# Patient Record
Sex: MALE | Race: WHITE | NOT HISPANIC OR LATINO | URBAN - METROPOLITAN AREA
[De-identification: names, ages, dates, MRNs, and addresses within clinical notes are randomized per-mention and may not be internally consistent; named-entity substitution may affect disease eponyms.]

---

## 2017-11-08 ENCOUNTER — OFFICE VISIT (OUTPATIENT)
Dept: URGENT CARE | Facility: CLINIC | Age: 22
End: 2017-11-08

## 2017-11-08 PROCEDURE — G0382 LEV 3 HOSP TYPE B ED VISIT: HCPCS

## 2017-11-16 NOTE — PROGRESS NOTES
Assessment    1  Mild intermittent asthma, unspecified whether complicated (243 85) (W09 42)    Plan  Mild intermittent asthma, unspecified whether complicated    · Flovent  MCG/ACT Inhalation Aerosol; INHALE 1 PUFF TWICE DAILY   · Ventolin  (90 Base) MCG/ACT Inhalation Aerosol Solution; INHALE 2PUFFS EVERY 4 HOURS AS NEEDED    Discussion/Summary  Discussion Summary:   Recommend taking daily antihistamine (Claritin, Zyrtec, or Allegra)  given for asthma inhalers to use until he able to establish care with PCP  inhalers as needed  up with PCP  Medication Side Effects Reviewed: Possible side effects of new medications were reviewed with the patient/guardian today  Understands and agrees with treatment plan: The treatment plan was reviewed with the patient/guardian  The patient/guardian understands and agrees with the treatment plan      Chief Complaint  Chief Complaint Free Text Note Form: Pt reports tonight he was having difficulty taking a deep breath  He used his ventolin inhaler but reports his ventolin and flovent are  and has not seen his PCP  History of Present Illness  HPI: Well appearing 24 yo male in no acute distress presents for request for refill on asthma medications  Pt reports intermittent wheezing, worse over the past 2 weeks  Pt reports asthma is worse with exposure to irritants  Pt reports he typically uses albuterol 2-3 times a month, but has been using it 3-4 times a week over the past 2 weeks  Hospital Based Practices Required Assessment:  Pain Assessment  the patient states they do not have pain  Abuse And Domestic Violence Screen   Yes, the patient is safe at home  -- The patient states no one is hurting them  Depression And Suicide Screen  No, the patient has not had thoughts of hurting themself  No, the patient has not felt depressed in the past 7 days  Prefered Language is  [place]  Primary Language is  English        Review of Systems  Focused-Male: Constitutional: no fever or chills, feels well, no tiredness, no recent weight loss or weight gain  Cardiovascular: no complaints of slow or fast heart rate, no chest pain, no palpitations, no leg claudication or lower extremity edema  Respiratory: as noted in HPI  Past Medical History  1  History of asthma (V12 69) (Z87 09)    Social History     · Never a smoker    Current Meds   1  Flovent 110 MCG/ACT AERO; Therapy: (Recorded:08Nov2017) to Recorded   2  Ventolin 90 MCG/ACT AERS; Therapy: (Recorded:08Nov2017) to Recorded    Allergies    1  No Known Drug Allergies    Vitals  Signs   Recorded: [id number] 06:41PM   Temperature: 98 6 F  Heart Rate: 79  Respiration: 16  Systolic: 995  Diastolic: 78  Height: 5 ft 10 in  Weight: 195 lb   BMI Calculated: 27 98  BSA Calculated: 2 06  O2 Saturation: 97    Physical Exam   Constitutional  General appearance: No acute distress, well appearing and well nourished  Eyes  Conjunctiva and lids: No swelling, erythema, or discharge  Pupils and irises: Equal, round and reactive to light  Ears, Nose, Mouth, and Throat  External inspection of ears and nose: Normal    Otoscopic examination: Tympanic membrance translucent with normal light reflex  Canals patent without erythema  Nasal mucosa, septum, and turbinates: Normal without edema or erythema  Oropharynx: Normal with no erythema, edema, exudate or lesions  Pulmonary  Respiratory effort: No increased work of breathing or signs of respiratory distress  Auscultation of lungs: Clear to auscultation  Cardiovascular  Auscultation of heart: Normal rate and rhythm, normal S1 and S2, without murmurs         Signatures   Electronically signed by : Abbey Fenton ; Nov 11 2017  8:29AM EST                       (Author)    Electronically signed by : Lacie Grigsby DO; Nov 15 2017  3:49PM EST                       (Co-author)

## 2018-02-09 ENCOUNTER — OFFICE VISIT (OUTPATIENT)
Dept: URGENT CARE | Facility: CLINIC | Age: 23
End: 2018-02-09

## 2018-02-09 VITALS
RESPIRATION RATE: 16 BRPM | HEART RATE: 88 BPM | TEMPERATURE: 100.2 F | BODY MASS INDEX: 27.77 KG/M2 | OXYGEN SATURATION: 95 % | DIASTOLIC BLOOD PRESSURE: 78 MMHG | HEIGHT: 70 IN | SYSTOLIC BLOOD PRESSURE: 142 MMHG | WEIGHT: 194 LBS

## 2018-02-09 DIAGNOSIS — R68.89 FLU-LIKE SYMPTOMS: ICD-10-CM

## 2018-02-09 DIAGNOSIS — J45.21 MILD INTERMITTENT ASTHMA WITH ACUTE EXACERBATION: Primary | ICD-10-CM

## 2018-02-09 PROBLEM — J45.20 MILD INTERMITTENT ASTHMA: Status: ACTIVE | Noted: 2018-02-09

## 2018-02-09 PROCEDURE — 99203 OFFICE O/P NEW LOW 30 MIN: CPT | Performed by: NURSE PRACTITIONER

## 2018-02-09 PROCEDURE — 87798 DETECT AGENT NOS DNA AMP: CPT | Performed by: NURSE PRACTITIONER

## 2018-02-09 RX ORDER — ALBUTEROL SULFATE 2.5 MG/3ML
2.5 SOLUTION RESPIRATORY (INHALATION) EVERY 6 HOURS PRN
Qty: 75 ML | Refills: 0 | Status: SHIPPED | OUTPATIENT
Start: 2018-02-09 | End: 2018-02-09 | Stop reason: SDUPTHER

## 2018-02-09 RX ORDER — ALBUTEROL SULFATE 90 UG/1
2 AEROSOL, METERED RESPIRATORY (INHALATION) EVERY 6 HOURS PRN
COMMUNITY

## 2018-02-09 RX ORDER — FLUTICASONE PROPIONATE 110 UG/1
AEROSOL, METERED RESPIRATORY (INHALATION)
COMMUNITY

## 2018-02-09 RX ORDER — ALBUTEROL SULFATE 90 UG/1
AEROSOL, METERED RESPIRATORY (INHALATION)
COMMUNITY

## 2018-02-09 RX ORDER — PREDNISONE 20 MG/1
40 TABLET ORAL DAILY
Qty: 10 TABLET | Refills: 0 | Status: SHIPPED | OUTPATIENT
Start: 2018-02-09 | End: 2018-02-09 | Stop reason: SDUPTHER

## 2018-02-09 RX ORDER — ALBUTEROL SULFATE 2.5 MG/3ML
2.5 SOLUTION RESPIRATORY (INHALATION) EVERY 6 HOURS PRN
Qty: 75 ML | Refills: 0 | Status: SHIPPED | OUTPATIENT
Start: 2018-02-09

## 2018-02-09 RX ORDER — OSELTAMIVIR PHOSPHATE 75 MG/1
75 CAPSULE ORAL EVERY 12 HOURS SCHEDULED
Qty: 10 CAPSULE | Refills: 0 | Status: SHIPPED | OUTPATIENT
Start: 2018-02-09 | End: 2018-02-14

## 2018-02-09 RX ORDER — PREDNISONE 20 MG/1
40 TABLET ORAL DAILY
Qty: 10 TABLET | Refills: 0 | Status: SHIPPED | OUTPATIENT
Start: 2018-02-09 | End: 2018-02-14

## 2018-02-09 RX ORDER — FLUTICASONE PROPIONATE 110 UG/1
1 AEROSOL, METERED RESPIRATORY (INHALATION) 2 TIMES DAILY
COMMUNITY

## 2018-02-10 LAB
FLUAV AG SPEC QL: ABNORMAL
FLUBV AG SPEC QL: DETECTED
RSV B RNA SPEC QL NAA+PROBE: ABNORMAL

## 2018-02-10 NOTE — ASSESSMENT & PLAN NOTE
Will send for influenza/RSV  Rx given for Tamiflu to hold and use if positive  Acetaminophen or ibuprofen as needed for symptom relief

## 2018-02-10 NOTE — PROGRESS NOTES
Assessment/Plan:    Mild intermittent asthma  Rx for prednisone  Refill given for albuterol neb  Increase hydration  Avoid irritants when possible  Follow up with PCP as needed  Flu-like symptoms  Will send for influenza/RSV  Rx given for Tamiflu to hold and use if positive  Acetaminophen or ibuprofen as needed for symptom relief  Diagnoses and all orders for this visit:    Mild intermittent asthma with acute exacerbation  -     Discontinue: predniSONE 20 mg tablet; Take 2 tablets (40 mg total) by mouth daily for 5 days  -     Discontinue: albuterol (2 5 mg/3 mL) 0 083 % nebulizer solution; Take 3 mL (2 5 mg total) by nebulization every 6 (six) hours as needed for wheezing  -     albuterol (2 5 mg/3 mL) 0 083 % nebulizer solution; Take 3 mL (2 5 mg total) by nebulization every 6 (six) hours as needed for wheezing  -     predniSONE 20 mg tablet; Take 2 tablets (40 mg total) by mouth daily for 5 days    Flu-like symptoms  -     Influenza A/B and RSV by PCR  -     oseltamivir (TAMIFLU) 75 mg capsule; Take 1 capsule (75 mg total) by mouth every 12 (twelve) hours for 5 days    Other orders  -     fluticasone (FLOVENT HFA) 110 MCG/ACT inhaler; Inhale 1 puff 2 (two) times a day  -     albuterol (PROVENTIL HFA,VENTOLIN HFA) 90 mcg/act inhaler; Inhale 2 puffs every 6 (six) hours as needed for wheezing  -     fluticasone (FLOVENT HFA) 110 MCG/ACT inhaler; Inhale  -     albuterol (VENTOLIN HFA) 90 mcg/act inhaler; Inhale          Subjective:      Patient ID: Charline Cisneros is a 25 y o  male  Cough   This is a new problem  The current episode started yesterday  The problem has been unchanged  The problem occurs hourly  The cough is non-productive  Associated symptoms include a fever, nasal congestion, postnasal drip, rhinorrhea, shortness of breath and wheezing   Pertinent negatives include no chest pain, chills, ear congestion, ear pain, headaches, heartburn, hemoptysis, myalgias, sore throat, sweats or weight loss  The symptoms are aggravated by dust and cold air  Risk factors for lung disease include occupational exposure  He has tried a beta-agonist inhaler, body position changes, rest and steroid inhaler for the symptoms  The treatment provided mild relief  His past medical history is significant for environmental allergies  The following portions of the patient's history were reviewed and updated as appropriate: allergies, current medications, past family history, past medical history, past social history, past surgical history and problem list     Review of Systems   Constitutional: Positive for fatigue and fever  Negative for activity change, appetite change, chills and weight loss  HENT: Positive for congestion, postnasal drip and rhinorrhea  Negative for dental problem, drooling, ear discharge, ear pain, facial swelling, hearing loss, mouth sores, nosebleeds, sinus pain, sinus pressure, sneezing, sore throat, tinnitus, trouble swallowing and voice change  Eyes: Negative  Respiratory: Positive for cough, chest tightness, shortness of breath and wheezing  Negative for apnea, hemoptysis, choking and stridor  Cardiovascular: Negative for chest pain, palpitations and leg swelling  Gastrointestinal: Negative  Negative for heartburn  Musculoskeletal: Negative  Negative for myalgias  Allergic/Immunologic: Positive for environmental allergies  Neurological: Negative  Negative for headaches  Objective:    Vitals:    02/09/18 1936   BP: 142/78   Pulse: 88   Resp: 16   Temp: 100 2 °F (37 9 °C)   SpO2: 95%        Physical Exam   Constitutional: He appears well-developed and well-nourished  He does not have a sickly appearance  No distress  HENT:   Head: Normocephalic and atraumatic  Right Ear: External ear normal    Left Ear: External ear normal    Nose: Nose normal    Mouth/Throat: Oropharynx is clear and moist  No oropharyngeal exudate     Eyes: Conjunctivae and EOM are normal  Pupils are equal, round, and reactive to light  Right eye exhibits no discharge  Left eye exhibits no discharge  No scleral icterus  Neck: Normal range of motion  No JVD present  No tracheal deviation present  No thyromegaly present  Cardiovascular: Normal rate, regular rhythm and normal heart sounds  No murmur heard  Pulmonary/Chest: Effort normal  No stridor  No respiratory distress  He has wheezes  He has no rales  He exhibits no tenderness  Lymphadenopathy:     He has no cervical adenopathy  Skin: Skin is warm and dry  No rash noted  He is not diaphoretic  No erythema  No pallor

## 2018-02-10 NOTE — ASSESSMENT & PLAN NOTE
Rx for prednisone  Refill given for albuterol neb  Increase hydration  Avoid irritants when possible  Follow up with PCP as needed

## 2018-02-10 NOTE — PATIENT INSTRUCTIONS
Symptoms consistent with viral upper respiratory infection, possibly influenza or bronchitis  Rx given for prednisone for asthma flare  Rx for Tamiflu to start taking if influenza is positive  Rx for albuterol nebulizer to use as needed  Recommend supportive therapy  Increase fluid intake and get plenty of rest   Mucinex and cough drops as needed for cough  Ibuprofen and acetaminophen as needed for pain or fever  Follow up as needed for new or worsening symptoms  Follow up with PCP in 4-5 days if no improvement  Asthma   WHAT YOU NEED TO KNOW:   Asthma is a lung disease that makes breathing difficult  Chronic inflammation and reactions to triggers narrow the airways in the lungs  Asthma can become life-threatening if it is not managed  DISCHARGE INSTRUCTIONS:   Return to the emergency department if:   · You have severe shortness of breath  · Your lips or nails turn blue or gray  · The skin around your neck and ribs pulls in with each breath  · You have shortness of breath, even after you take your short-term medicine as directed  · Your peak flow numbers are in the red zone of your AAP  Contact your healthcare provider if:   · You run out of medicine before your next refill is due  · Your symptoms get worse  · You need to take more medicine than usual to control your symptoms  · You have questions or concerns about your condition or care  Medicines:   · Medicines  decrease inflammation, open airways, and make it easier to breathe  Medicines may be inhaled, taken as a pill, or injected  Short-term medicines relieve your symptoms quickly  Long-term medicines are used to prevent future attacks  You may also need medicine to help control your allergies  Ask your healthcare provider for more information about the medicine you are given and how to take it safely  · Take your medicine as directed    Contact your healthcare provider if you think your medicine is not helping or if you have side effects  Tell him of her if you are allergic to any medicine  Keep a list of the medicines, vitamins, and herbs you take  Include the amounts, and when and why you take them  Bring the list or the pill bottles to follow-up visits  Carry your medicine list with you in case of an emergency  Follow up with your healthcare provider as directed: You will need to return to make sure your medicine is working and your symptoms are controlled  You may be referred to an asthma specialist  Sajan Mireya may be asked to keep a record of your peak flow values and bring it with you to your appointments  Write down your questions so you remember to ask them during your visits  Manage your symptoms and prevent future attacks:   · Follow your Asthma Action Plan (AAP)  This is a written plan that you and your healthcare provider create  It explains which medicine you need and when to change doses if necessary  It also explains how you can monitor symptoms and use a peak flow meter  The meter measures how well your lungs are working  · Manage other health conditions , such as allergies, acid reflux, and sleep apnea  · Identify and avoid triggers  These may include pets, dust mites, mold, and cockroaches  · Do not smoke or be around others who smoke  Nicotine and other chemicals in cigarettes and cigars can cause lung damage  Ask your healthcare provider for information if you currently smoke and need help to quit  E-cigarettes or smokeless tobacco still contain nicotine  Talk to your healthcare provider before you use these products  · Ask about the flu vaccine  The flu can make your asthma worse  You may need a yearly flu shot  © 2017 [address] [employer] Information is for End User's use only and may not be sold, redistributed or otherwise used for commercial purposes   All illustrations and images included in CareNotes® are the copyrighted property of [employer]  or [facility] [employer]  The above information is an  only  It is not intended as medical advice for individual conditions or treatments  Talk to your doctor, nurse or pharmacist before following any medical regimen to see if it is safe and effective for you

## 2018-02-11 ENCOUNTER — TELEPHONE (OUTPATIENT)
Dept: URGENT CARE | Facility: CLINIC | Age: 23
End: 2018-02-11

## 2018-02-11 NOTE — TELEPHONE ENCOUNTER
----- Message from Sahra Reynolds, [facility] sent at 2/11/2018  7:48 AM EST -----  Please let pt know that they were pos for the flu and to continue treatment plan   Thank you     ILIANA Pittman